# Patient Record
Sex: MALE | Race: WHITE | Employment: FULL TIME | URBAN - METROPOLITAN AREA
[De-identification: names, ages, dates, MRNs, and addresses within clinical notes are randomized per-mention and may not be internally consistent; named-entity substitution may affect disease eponyms.]

---

## 2017-07-24 ENCOUNTER — HOSPITAL ENCOUNTER (EMERGENCY)
Facility: CLINIC | Age: 27
Discharge: HOME | End: 2017-07-24
Attending: EMERGENCY MEDICINE | Admitting: EMERGENCY MEDICINE
Payer: OTHER MISCELLANEOUS

## 2017-07-24 VITALS
OXYGEN SATURATION: 95 % | RESPIRATION RATE: 18 BRPM | TEMPERATURE: 99.2 F | HEART RATE: 79 BPM | BODY MASS INDEX: 28.7 KG/M2 | SYSTOLIC BLOOD PRESSURE: 142 MMHG | HEIGHT: 71 IN | WEIGHT: 205 LBS | DIASTOLIC BLOOD PRESSURE: 76 MMHG

## 2017-07-24 DIAGNOSIS — R42 DIZZINESS: ICD-10-CM

## 2017-07-24 DIAGNOSIS — Z56.9 ADVERSE EXPOSURE IN WORKPLACE: ICD-10-CM

## 2017-07-24 PROCEDURE — 99283 EMERGENCY DEPT VISIT LOW MDM: CPT

## 2017-07-24 SDOH — ECONOMIC STABILITY - INCOME SECURITY: UNSPECIFIED PROBLEMS RELATED TO EMPLOYMENT: Z56.9

## 2017-07-24 NOTE — ED PROVIDER NOTES
"  History     Chief Complaint:  Possible exposure to heroin    HPI   William Freitas is a 27 year old male who works as a  and developed symptoms after responding to an unresponsive person with a needle and powdered drugs on their person.  As they were driving away from the scene he became dizzy, light headed, and felt his breathing and heart rate speed up.  Symptoms have now improved.    Allergies:  No known drug allergies.     Medications:    No regular medications    Past Medical History:    History reviewed.  No significant past medical history.     Past Surgical History:    [place] teeth    Family History:    Grandmother-Colorectal cancer  Grandfather-colorectal cancer    Social History:  Smoking status: Never smoker  Alcohol use: No  Marital Status:        Review of Systems  Ten system ROS reviewed and is negative except as above    Physical Exam     Patient Vitals for the past 24 hrs:   BP Temp Temp src Pulse Resp SpO2 Height Weight   07/24/17 0608 (!) 151/97 99.2  F (37.3  C) Temporal 79 18 100 % 1.803 m (5' 11\") 93 kg (205 lb)       Physical Exam  Eyes:  Sclera white; Pupils are equal and round 3mm  ENT:    External ears and nares normal  CV:  Regular rate and rhythm, No murmur   Resp:  Breath sounds clear and equal bilaterally    Non-labored, no retractions or accessory muscle use  MS:  Moves all extremities  Skin:  Warm and dry  Neuro: Speech is normal and fluent. No apparent deficit.        Emergency Department Course       Emergency Department Course:  Nursing notes and vitals reviewed.  I performed an exam of the patient as documented above.    Findings and plan explained to the patient. Patient discharged home with instructions regarding supportive care, medications, and reasons to return. The importance of close follow-up was reviewed.     Impression & Plan      Medical Decision Making:  William Freitas is a 27 year old male who presents for evaluation of symptoms that began " after evaluating a patient that had abused suspected opioids/heroin. There are very potent narcotics that can cause rapid repository suppression and fatality. He is not demonstrating any current signs of narcotic toxidrome. His pupils are not pinpoint, his breathing and his oxygen SATs are normal. I feel that he does not need any prolonged observation as these agents are typically fast acting. I recommend that he shower and wash his clothes before he uses them again. A letter for work was provided and he was discharged.       Diagnosis:    ICD-10-CM   1. Adverse exposure in workplace Z56.9   2. Dizziness R42       Disposition:  Patient is discharged to home.      Aretha Hart MD  7/24/2017   [facility] EMERGENCY DEPARTMENT       Aretha Hart MD  07/26/17 1114

## 2017-07-24 NOTE — ED AVS SNAPSHOT
[facility] Emergency Department    [address]    [facility] [address]    Phone:  [phone]    Fax:  [phone]                                       William Freitas   MRN: [id number]    Department:  [facility] Emergency Department   Date of Visit:  7/24/2017           After Visit Summary Signature Page     I have received my discharge instructions, and my questions have been answered. I have discussed any challenges I see with this plan with the nurse or doctor.    ..........................................................................................................................................  Patient/Patient Representative Signature      ..........................................................................................................................................  Patient Representative Print Name and Relationship to Patient    ..................................................               ................................................  Date                                            Time    ..........................................................................................................................................  Reviewed by Signature/Title    ...................................................              ..............................................  Date                                                            Time

## 2017-07-24 NOTE — ED AVS SNAPSHOT
[facility] Emergency Department    [address]    [facility] [address]    Phone:  [phone]    Fax:  [phone]                                       William Freitas   MRN: [id number]    Department:  [facility] Emergency Department   Date of Visit:  7/24/2017           Patient Information     Date Of Birth          1990        Your diagnoses for this visit were:     Adverse exposure in workplace     Dizziness        You were seen by Aretha Hart MD.      Follow-up Information     Follow up with [facility] Emergency Department.    Specialty:  EMERGENCY MEDICINE    Why:  If symptoms worsen    Contact information:    [address]  [facility] [address] [phone]        Discharge Instructions       Return immediately for:  - chest pain  - passing out  - fevers  - rash  - worsening symptoms    24 Hour Appointment Hotline       To make an appointment at any [facility] clinic, call [phone] ([phone]). If you don't have a family doctor or clinic, we will help you find one. [facility] clinics are conveniently located to serve the needs of you and your family.             Review of your medicines      Notice     You have not been prescribed any medications.            Orders Needing Specimen Collection     None      Pending Results     No orders found from 7/22/2017 to 7/25/2017.            Pending Culture Results     No orders found from 7/22/2017 to 7/25/2017.            Pending Results Instructions     If you had any lab results that were not finalized at the time of your Discharge, you can call the ED Lab Result RN at [phone]. You will be contacted by this team for any positive Lab results or changes in treatment. The nurses are available 7 days a week from 10A to 6:30P.  You can leave a message 24 hours per day and they will return your call.        Test Results From Your Hospital Stay               Clinical  Quality Measure: Blood Pressure Screening     Your blood pressure was checked while you were in the emergency department today. The last reading we obtained was  BP: (!) 151/97 . Please read the guidelines below about what these numbers mean and what you should do about them.  If your systolic blood pressure (the top number) is less than 120 and your diastolic blood pressure (the bottom number) is less than 80, then your blood pressure is normal. There is nothing more that you need to do about it.  If your systolic blood pressure (the top number) is 120-139 or your diastolic blood pressure (the bottom number) is 80-89, your blood pressure may be higher than it should be. You should have your blood pressure rechecked within a year by a primary care provider.  If your systolic blood pressure (the top number) is 140 or greater or your diastolic blood pressure (the bottom number) is 90 or greater, you may have high blood pressure. High blood pressure is treatable, but if left untreated over time it can put you at risk for heart attack, stroke, or kidney failure. You should have your blood pressure rechecked by a primary care provider within the next 4 weeks.  If your provider in the emergency department today gave you specific instructions to follow-up with your doctor or provider even sooner than that, you should follow that instruction and not wait for up to 4 weeks for your follow-up visit.        Thank you for choosing [facility]       Thank you for choosing [facility] for your care. Our goal is always to provide you with excellent care. Hearing back from our patients is one way we can continue to improve our services. Please take a few minutes to complete the written survey that you may receive in the mail after you visit with us. Thank you!        [facility] Information     [facility] gives you secure access to your electronic health record. If you see a primary care provider, you can also send messages to your care team  and make appointments. If you have questions, please call your primary care clinic.  If you do not have a primary care provider, please call [phone] and they will assist you.        Care EveryWhere ID     This is your Care EveryWhere ID. This could be used by other organizations to access your [facility] medical records  [id number]        Equal Access to Services     HEDY QUEZADA : Christine Guevara, kavya contreras, connie cueto. So [facility] [phone].    ATENCIÓN: Si habla español, tiene a farris disposición servicios gratuitos de asistencia lingüística. Peg al [phone].    We comply with applicable federal civil rights laws and [place] laws. We do not discriminate on the basis of race, color, national origin, age, disability sex, sexual orientation or gender identity.            After Visit Summary       This is your record. Keep this with you and show to your community pharmacist(s) and doctor(s) at your next visit.

## 2017-07-24 NOTE — LETTER
[facility] EMERGENCY DEPARTMENT  [address] [place] [address]  [phone]    William Freitas  [address] [facility] [address]  [phone] (home)     : 1990      To Whom it may concern:    William Freitas was seen in our Emergency Department today, 2017 after a work-related exposure.  He will be able to return on his next shift.    Sincerely,      Aretha Hart MD

## 2017-07-24 NOTE — ED NOTES
Pt is with [employer].  Possibly exposed to powdered heroin when confiscating the drugs.  States he started feeling dizzy and lightheaded.  Currently reports symptoms have improved.

## 2018-09-13 ENCOUNTER — APPOINTMENT (OUTPATIENT)
Dept: GENERAL RADIOLOGY | Facility: CLINIC | Age: 28
End: 2018-09-13
Attending: EMERGENCY MEDICINE
Payer: OTHER MISCELLANEOUS

## 2018-09-13 ENCOUNTER — HOSPITAL ENCOUNTER (EMERGENCY)
Facility: CLINIC | Age: 28
Discharge: HOME | End: 2018-09-13
Attending: EMERGENCY MEDICINE | Admitting: EMERGENCY MEDICINE
Payer: OTHER MISCELLANEOUS

## 2018-09-13 VITALS
DIASTOLIC BLOOD PRESSURE: 83 MMHG | BODY MASS INDEX: 28.7 KG/M2 | WEIGHT: 205 LBS | HEART RATE: 68 BPM | SYSTOLIC BLOOD PRESSURE: 130 MMHG | RESPIRATION RATE: 16 BRPM | TEMPERATURE: 98.6 F | HEIGHT: 71 IN | OXYGEN SATURATION: 95 %

## 2018-09-13 DIAGNOSIS — S93.491A SPRAIN OF ANTERIOR TALOFIBULAR LIGAMENT OF RIGHT ANKLE, INITIAL ENCOUNTER: ICD-10-CM

## 2018-09-13 PROCEDURE — 73610 X-RAY EXAM OF ANKLE: CPT | Mod: RT

## 2018-09-13 PROCEDURE — 99283 EMERGENCY DEPT VISIT LOW MDM: CPT | Mod: 25

## 2018-09-13 ASSESSMENT — ENCOUNTER SYMPTOMS
NUMBNESS: 0
JOINT SWELLING: 0
WEAKNESS: 0
ARTHRALGIAS: 1

## 2018-09-13 NOTE — ED AVS SNAPSHOT
[facility] Emergency Department    [address]    [facility] [address]    Phone:  [phone]    Fax:  [phone]                                       William Freitas   MRN: [id number]    Department:  [facility] Emergency Department   Date of Visit:  9/13/2018           After Visit Summary Signature Page     I have received my discharge instructions, and my questions have been answered. I have discussed any challenges I see with this plan with the nurse or doctor.    ..........................................................................................................................................  Patient/Patient Representative Signature      ..........................................................................................................................................  Patient Representative Print Name and Relationship to Patient    ..................................................               ................................................  Date                                   Time    ..........................................................................................................................................  Reviewed by Signature/Title    ...................................................              ..............................................  Date                                               Time          22EPIC Rev 08/18

## 2018-09-13 NOTE — ED PROVIDER NOTES
"  History     Chief Complaint:  Ankle/Foot right    HPI   William Freitas is a 28 year old male who presents to the emergency department today for evaluation of a right ankle pain. The patient works as a  for [employer] when he was working and needed to jump a wooden private fence. He landed awkwardly on his right ankle and sustained some abrasions from the fence on his left shin. After a couple of minutes, he started to experience worsening right ankle pain, but no swelling. He has been able to ambulate on his ankle. He was concerned about the right ankle prompting his visit to the emergency department. He denies hearing a pop or snap, numbness or weakness in the ankle, and desire for crutches. Of note, the patient denies desire for pain medication.    Allergies:  No Known Drug Allergies    Medications:    The patient is currently on no regular medications.    Past Medical History:    History reviewed. No pertinent past medical history.    Past Surgical History:    [place] teeth    Family History:    Colorectal cancer    Social History:  The patient was alone.  Smoking Status: Never  Smokeless Tobacco: Never  Marital Status:      Review of Systems   Musculoskeletal: Positive for arthralgias. Negative for gait problem and joint swelling.   Neurological: Negative for weakness and numbness.   All other systems reviewed and are negative.    Physical Exam     Patient Vitals for the past 24 hrs:   BP Temp Temp src Heart Rate Resp SpO2 Height Weight   09/13/18 0300 148/80 98.6  F (37  C) Oral 79 18 99 % 1.803 m (5' 11\") 93 kg (205 lb)         Physical Exam      HEENT:  mmm  Neck: supple  CV: ppi, regular   Resp: speaking in full sentences with any resp distress  Ext:  R ANKLE    No TTP over medial malleolus  No TTP over lateral malleolus  No TTP base of 5th MT  No TTP fibular head  + soft tissue swelling present inferior to lateral malleolus  This is a closed injury   able to fire foot/toe flexors " and extensors  sensation and perfusion intact throughout foot  +ttp with inversion lateral mall twds 5th MT head    Remainder skeletal survey unremarkable    Skin: warm dry well perfused  Neuro: Alert, no gross motor or sensory deficits,able to weight bear                        Emergency Department Course   Imaging:  Radiology findings were communicated with the patient who voiced understanding of the findings.  Ankle XR, G/E 3 views, right  IMPRESSION: No acute fracture or dislocation.  Report per radiology     Emergency Department Course:  Nursing notes and vitals reviewed.  The patient was sent for a Ankle XR, G/E 3 views, right while in the emergency department, results above.   0316: I performed an exam of the patient as documented above.   0351: Patient rechecked and updated.   Findings and plan explained to the Patient. Patient discharged home with instructions regarding supportive care, medications, and reasons to return. The importance of close follow-up was reviewed.  I personally reviewed the imaging results with the Patient and answered all related questions prior to discharge.    Impression & Plan    Medical Decision Making:  William Freitas is a 28 year old male who presents for evaluation of ankle pain.  Signs and symptoms are consistent with an ankle sprain. No signs of septic arthritis, gout, pseudogout, fracture, cellulitis, etc.  There are no signs of fracture.  The patients neurovascular status is normal. A head to toe trauma exam is otherwise negative; the likelihood of other serious sequelae of trauma (spine, head, chest, abdomen, other extremities, pelvis) is low.  X-ray was obtained that was negative for fracture or dislocation. Plan is for protected weightbearing, RICE treatment with ice 15 minutes on, 1 hour off, ace wrap.  Patient will advance weightbearing and follow-up with primary in 2-3 days.  They will begin gentle ROM exercises of the ankle including PF,DF, alphabet exercises.      Diagnosis:    ICD-10-CM    1. Sprain of anterior talofibular ligament of right ankle, initial encounter S93.491A        Disposition:  discharged to home    Scribe Disclosure:  IMac, am serving as a scribe at 3:16 AM on 9/13/2018 to document services personally performed by Charles Chin MD based on my observations and the provider's statements to me.     9/13/2018   [facility] EMERGENCY DEPARTMENT       Charles Chin MD  09/13/18 0534

## 2018-09-13 NOTE — ED AVS SNAPSHOT
[facility] Emergency Department    [address]    [facility] [address]    Phone:  [phone]    Fax:  [phone]                                       William Freitas   MRN: [id number]    Department:  [facility] Emergency Department   Date of Visit:  9/13/2018           Patient Information     Date Of Birth          1990        Your diagnoses for this visit were:     Sprain of anterior talofibular ligament of right ankle, initial encounter        You were seen by Charles Chin MD.        Discharge Instructions       Please make an appointment to follow up with your primary care provider in 5-7 days if not improving.    When able, elevate your ankl3 above the level of your heart to help with swelling    Use ice bags for 15-20 minutes every 1-2 hours for next 12-24 hours to help with swelling    You can also use an ace bandage for comfort and help with swelling    Avoid activities that cause increased pain, gentle range of motion is okay, and actually helpful to get you back to work/school faster.          Discharge Instructions  Ankle Sprain    An ankle sprain is a stretching or tearing of a ligament around your ankle joint. In most cases, we recommend resting the ankle for about 3 days, followed by return to activity. Some severe sprains need longer periods of rest, or can require a cast or boot to immobilize them.    Generally, every Emergency Department visit should have a follow-up clinic visit with either a primary or a specialty clinic/provider. Please follow-up as instructed by your emergency provider today.    Return to the Emergency Department if:    Your pain is much worse, or if there is pain in a new area.    Your foot or leg becomes pale, cool, blue, or numb or tingling.    There is anything concerning to you about how your ankle looks.    Any splint or device is feeling too tight, causing pain, or rubbing into your skin.    Follow-up with your  provider:    As recommended by your emergency provider.    If your ankle is not back to normal within about 1 week.    If you are involved in significant athletic activities.        Treatment:    Apply ice your injured area for 15 minutes at a time, at least 3 times a day for the first 1-2 days. Use a cloth between the ice bag and your skin to prevent frostbite.     Do not sleep with an ice pack or heating pad on, since this can cause burns or skin injury.    Raise the injured area above the level of your heart as much as possible in the first 1-2 days.    Pain medications -- You may take a pain medication such as Tylenol  (acetaminophen), Advil , Nuprin  (ibuprofen) or Aleve  (naproxen).    Splint. We often give a stirrup-shaped ankle splint to support your ankle and prevent it from turning again. Wear this all the time for the first 3-5 days, and then as directed by your provider.    Crutches. If you cannot put weight on the ankle without a lot of pain, we recommend crutches. You can put as much weight on the ankle as possible without severe pain.     Compression. An elastic bandage (Ace  wrap) can help with pain and swelling. Remove this at least twice a day, and leave it off for several hours if you develop swelling of the foot.     Exercises.  Movements, like rotating the foot in circles, should be started when swelling improves.   If you were given a prescription for medicine here today, be sure to read all of the information (including the package insert) that comes with your prescription.  This will include important information about the medicine, its side effects, and any warnings that you need to know about.  The pharmacist who fills the prescription can provide more information and answer questions you may have about the medicine.  If you have questions or concerns that the pharmacist cannot address, please call or return to the Emergency Department.  Remember that you can always come back to the  Emergency Department if you are not able to see your regular provider in the amount of time listed above, if you get any new symptoms, or if there is anything that worries you.      24 Hour Appointment Hotline       To make an appointment at any [facility]3], call [phone] ([phone]). If you don't have a family doctor or clinic, we will help you find one. [facility] are conveniently located to serve the needs of you and your family.             Review of your medicines      Notice     You have not been prescribed any medications.            Procedures and tests performed during your visit     Ankle XR, G/E 3 views, right      Orders Needing Specimen Collection     None      Pending Results     No orders found from 9/11/2018 to 9/14/2018.            Pending Culture Results     No orders found from 9/11/2018 to 9/14/2018.            Pending Results Instructions     If you had any lab results that were not finalized at the time of your Discharge, you can call the ED Lab Result RN at [phone]. You will be contacted by this team for any positive Lab results or changes in treatment. The nurses are available 7 days a week from 10A to 6:30P.  You can leave a message 24 hours per day and they will return your call.        Test Results From Your Hospital Stay        9/13/2018  3:48 AM      Narrative     XR ANKLE RT G/E 3 VW   9/13/2018 3:43 AM     HISTORY: Pain.    COMPARISON: None.         Impression     IMPRESSION: No acute fracture or dislocation.    CHAPIN RAMIREZ MD                Clinical Quality Measure: Blood Pressure Screening     Your blood pressure was checked while you were in the emergency department today. The last reading we obtained was  BP: 148/80 . Please read the guidelines below about what these numbers mean and what you should do about them.  If your systolic blood pressure (the top number) is less than 120 and your diastolic blood pressure (the bottom number) is less than 80,  then your blood pressure is normal. There is nothing more that you need to do about it.  If your systolic blood pressure (the top number) is 120-139 or your diastolic blood pressure (the bottom number) is 80-89, your blood pressure may be higher than it should be. You should have your blood pressure rechecked within a year by a primary care provider.  If your systolic blood pressure (the top number) is 140 or greater or your diastolic blood pressure (the bottom number) is 90 or greater, you may have high blood pressure. High blood pressure is treatable, but if left untreated over time it can put you at risk for heart attack, stroke, or kidney failure. You should have your blood pressure rechecked by a primary care provider within the next 4 weeks.  If your provider in the emergency department today gave you specific instructions to follow-up with your doctor or provider even sooner than that, you should follow that instruction and not wait for up to 4 weeks for your follow-up visit.        Thank you for choosing [facility]       Thank you for choosing [facility] for your care. Our goal is always to provide you with excellent care. Hearing back from our patients is one way we can continue to improve our services. Please take a few minutes to complete the written survey that you may receive in the mail after you visit with us. Thank you!        [employer] Information     [employer] gives you secure access to your electronic health record. If you see a primary care provider, you can also send messages to your care team and make appointments. If you have questions, please call your primary care clinic.  If you do not have a primary care provider, please call [phone] and they will assist you.        Care EveryWhere ID     This is your Care EveryWhere ID. This could be used by other organizations to access your [facility] medical records  [id number]        Equal Access to Services     HEDY RUDOLPH: Christine munguia  kavya Guevara, td sanchesmaconnie turner. So [facility] [phone].    ATENCIÓN: Si habla español, tiene a farris disposición servicios gratuitos de asistencia lingüística. Llame al [phone].    We comply with applicable federal civil rights laws and [place] laws. We do not discriminate on the basis of race, color, national origin, age, disability, sex, sexual orientation, or gender identity.            After Visit Summary       This is your record. Keep this with you and show to your community pharmacist(s) and doctor(s) at your next visit.

## 2018-09-13 NOTE — LETTER
September 13, 2018      To Whom It May Concern:      William Freitas was seen in our Emergency Department today, 9/13/18.  Please excuse William Freitas from work for his condition over the next 3 days.  He may return to work/school when improved.    Sincerely,    Rea Frausto RN

## 2018-09-13 NOTE — ED TRIAGE NOTES
Patient alert and oriented times 3 .  Abc intact right ankle pain 0130. Jumping over fence at work

## 2018-09-13 NOTE — DISCHARGE INSTRUCTIONS
Please make an appointment to follow up with your primary care provider in 5-7 days if not improving.    When able, elevate your ankl3 above the level of your heart to help with swelling    Use ice bags for 15-20 minutes every 1-2 hours for next 12-24 hours to help with swelling    You can also use an ace bandage for comfort and help with swelling    Avoid activities that cause increased pain, gentle range of motion is okay, and actually helpful to get you back to work/school faster.          Discharge Instructions  Ankle Sprain    An ankle sprain is a stretching or tearing of a ligament around your ankle joint. In most cases, we recommend resting the ankle for about 3 days, followed by return to activity. Some severe sprains need longer periods of rest, or can require a cast or boot to immobilize them.    Generally, every Emergency Department visit should have a follow-up clinic visit with either a primary or a specialty clinic/provider. Please follow-up as instructed by your emergency provider today.    Return to the Emergency Department if:    Your pain is much worse, or if there is pain in a new area.    Your foot or leg becomes pale, cool, blue, or numb or tingling.    There is anything concerning to you about how your ankle looks.    Any splint or device is feeling too tight, causing pain, or rubbing into your skin.    Follow-up with your provider:    As recommended by your emergency provider.    If your ankle is not back to normal within about 1 week.    If you are involved in significant athletic activities.        Treatment:    Apply ice your injured area for 15 minutes at a time, at least 3 times a day for the first 1-2 days. Use a cloth between the ice bag and your skin to prevent frostbite.     Do not sleep with an ice pack or heating pad on, since this can cause burns or skin injury.    Raise the injured area above the level of your heart as much as possible in the first 1-2 days.    Pain medications --  You may take a pain medication such as Tylenol  (acetaminophen), Advil , Nuprin  (ibuprofen) or Aleve  (naproxen).    Splint. We often give a stirrup-shaped ankle splint to support your ankle and prevent it from turning again. Wear this all the time for the first 3-5 days, and then as directed by your provider.    Crutches. If you cannot put weight on the ankle without a lot of pain, we recommend crutches. You can put as much weight on the ankle as possible without severe pain.     Compression. An elastic bandage (Ace  wrap) can help with pain and swelling. Remove this at least twice a day, and leave it off for several hours if you develop swelling of the foot.     Exercises.  Movements, like rotating the foot in circles, should be started when swelling improves.   If you were given a prescription for medicine here today, be sure to read all of the information (including the package insert) that comes with your prescription.  This will include important information about the medicine, its side effects, and any warnings that you need to know about.  The pharmacist who fills the prescription can provide more information and answer questions you may have about the medicine.  If you have questions or concerns that the pharmacist cannot address, please call or return to the Emergency Department.  Remember that you can always come back to the Emergency Department if you are not able to see your regular provider in the amount of time listed above, if you get any new symptoms, or if there is anything that worries you.

## 2020-03-02 ENCOUNTER — HEALTH MAINTENANCE LETTER (OUTPATIENT)
Age: 30
End: 2020-03-02

## 2020-12-20 ENCOUNTER — HEALTH MAINTENANCE LETTER (OUTPATIENT)
Age: 30
End: 2020-12-20

## 2021-04-18 ENCOUNTER — HEALTH MAINTENANCE LETTER (OUTPATIENT)
Age: 31
End: 2021-04-18

## 2021-10-03 ENCOUNTER — HEALTH MAINTENANCE LETTER (OUTPATIENT)
Age: 31
End: 2021-10-03

## 2022-05-14 ENCOUNTER — HEALTH MAINTENANCE LETTER (OUTPATIENT)
Age: 32
End: 2022-05-14

## 2022-09-04 ENCOUNTER — HEALTH MAINTENANCE LETTER (OUTPATIENT)
Age: 32
End: 2022-09-04

## 2023-06-03 ENCOUNTER — HEALTH MAINTENANCE LETTER (OUTPATIENT)
Age: 33
End: 2023-06-03